# Patient Record
Sex: MALE | URBAN - METROPOLITAN AREA
[De-identification: names, ages, dates, MRNs, and addresses within clinical notes are randomized per-mention and may not be internally consistent; named-entity substitution may affect disease eponyms.]

---

## 2022-04-30 ENCOUNTER — TELEPHONE ENCOUNTER (OUTPATIENT)
Dept: URBAN - METROPOLITAN AREA CLINIC 121 | Facility: CLINIC | Age: 47
End: 2022-04-30

## 2022-05-01 ENCOUNTER — TELEPHONE ENCOUNTER (OUTPATIENT)
Dept: URBAN - METROPOLITAN AREA CLINIC 121 | Facility: CLINIC | Age: 47
End: 2022-05-01

## 2022-05-19 ENCOUNTER — OFFICE VISIT (OUTPATIENT)
Dept: URBAN - METROPOLITAN AREA CLINIC 27 | Facility: CLINIC | Age: 47
End: 2022-05-19
Payer: COMMERCIAL

## 2022-05-19 ENCOUNTER — LAB OUTSIDE AN ENCOUNTER (OUTPATIENT)
Dept: URBAN - METROPOLITAN AREA CLINIC 27 | Facility: CLINIC | Age: 47
End: 2022-05-19

## 2022-05-19 ENCOUNTER — DASHBOARD ENCOUNTERS (OUTPATIENT)
Age: 47
End: 2022-05-19

## 2022-05-19 ENCOUNTER — WEB ENCOUNTER (OUTPATIENT)
Dept: URBAN - METROPOLITAN AREA CLINIC 27 | Facility: CLINIC | Age: 47
End: 2022-05-19

## 2022-05-19 VITALS
SYSTOLIC BLOOD PRESSURE: 120 MMHG | WEIGHT: 170 LBS | HEIGHT: 68 IN | DIASTOLIC BLOOD PRESSURE: 81 MMHG | HEART RATE: 58 BPM | BODY MASS INDEX: 25.76 KG/M2

## 2022-05-19 DIAGNOSIS — R10.2 SUPRAPUBIC DISCOMFORT: ICD-10-CM

## 2022-05-19 DIAGNOSIS — I10 HYPERTENSION: ICD-10-CM

## 2022-05-19 DIAGNOSIS — Z12.11 SCREEN FOR COLON CANCER: ICD-10-CM

## 2022-05-19 PROBLEM — 38341003 HYPERTENSION: Status: ACTIVE | Noted: 2022-05-19

## 2022-05-19 PROCEDURE — 99203 OFFICE O/P NEW LOW 30 MIN: CPT | Performed by: INTERNAL MEDICINE

## 2022-05-19 NOTE — HPI-TODAY'S VISIT:
Patient here at the request of Dr. Loredo for initial colorectal cancer screening. He has not had a prior colonoscopy or flexible sigmoidoscopy. He currently has no GI symptoms aside from intermittent suprapubic discomfort which has actually been present since college. It is described as "a pressure". He denies pain. It is better when he has a bowel movement, though he denies constipation or diarrhea. It is worse with stress. He does not take any medication for this. A recent trial of Nexium was unhelpful. His symptoms are not nocturnal. His symptoms were much more severe in college but appear to be much less bothersome nowadays. He has no urinary symptoms. He does take a probiotic, though only PRN. There is no family history of colon cancer or polyps, but his son has celiac disease. He states that recent labs were normal.

## 2022-07-12 ENCOUNTER — TELEPHONE ENCOUNTER (OUTPATIENT)
Dept: URBAN - METROPOLITAN AREA CLINIC 27 | Facility: CLINIC | Age: 47
End: 2022-07-12

## 2022-07-12 RX ORDER — POLYETHYLENE GLYCOL-3350 AND ELECTROLYTES 236; 6.74; 5.86; 2.97; 22.74 G/274.31G; G/274.31G; G/274.31G; G/274.31G; G/274.31G
AS DIRECTED POWDER, FOR SOLUTION ORAL ONCE
Qty: 1 | Refills: 0 | OUTPATIENT
Start: 2022-07-12 | End: 2022-07-13

## 2022-07-13 ENCOUNTER — WEB ENCOUNTER (OUTPATIENT)
Dept: URBAN - METROPOLITAN AREA CLINIC 27 | Facility: CLINIC | Age: 47
End: 2022-07-13

## 2022-08-01 ENCOUNTER — WEB ENCOUNTER (OUTPATIENT)
Dept: URBAN - METROPOLITAN AREA SURGERY CENTER 7 | Facility: SURGERY CENTER | Age: 47
End: 2022-08-01

## 2022-08-03 ENCOUNTER — CLAIMS CREATED FROM THE CLAIM WINDOW (OUTPATIENT)
Dept: URBAN - METROPOLITAN AREA CLINIC 4 | Facility: CLINIC | Age: 47
End: 2022-08-03
Payer: COMMERCIAL

## 2022-08-03 ENCOUNTER — OFFICE VISIT (OUTPATIENT)
Dept: URBAN - METROPOLITAN AREA SURGERY CENTER 7 | Facility: SURGERY CENTER | Age: 47
End: 2022-08-03
Payer: COMMERCIAL

## 2022-08-03 ENCOUNTER — WEB ENCOUNTER (OUTPATIENT)
Dept: URBAN - METROPOLITAN AREA SURGERY CENTER 7 | Facility: SURGERY CENTER | Age: 47
End: 2022-08-03

## 2022-08-03 DIAGNOSIS — D12.2 ADENOMA OF ASCENDING COLON: ICD-10-CM

## 2022-08-03 DIAGNOSIS — D12.2 BENIGN NEOPLASM OF ASCENDING COLON: ICD-10-CM

## 2022-08-03 DIAGNOSIS — Z12.11 COLON CANCER SCREENING: ICD-10-CM

## 2022-08-03 PROCEDURE — 45385 COLONOSCOPY W/LESION REMOVAL: CPT | Performed by: INTERNAL MEDICINE

## 2022-08-03 PROCEDURE — 88305 TISSUE EXAM BY PATHOLOGIST: CPT | Performed by: PATHOLOGY

## 2022-08-03 PROCEDURE — G8907 PT DOC NO EVENTS ON DISCHARG: HCPCS | Performed by: INTERNAL MEDICINE
